# Patient Record
Sex: FEMALE | Race: BLACK OR AFRICAN AMERICAN | NOT HISPANIC OR LATINO | Employment: FULL TIME | URBAN - METROPOLITAN AREA
[De-identification: names, ages, dates, MRNs, and addresses within clinical notes are randomized per-mention and may not be internally consistent; named-entity substitution may affect disease eponyms.]

---

## 2018-02-06 ENCOUNTER — OFFICE VISIT (OUTPATIENT)
Dept: SLEEP MEDICINE | Facility: CLINIC | Age: 53
End: 2018-02-06
Payer: COMMERCIAL

## 2018-02-06 VITALS
HEART RATE: 76 BPM | WEIGHT: 182.31 LBS | DIASTOLIC BLOOD PRESSURE: 80 MMHG | HEIGHT: 64 IN | SYSTOLIC BLOOD PRESSURE: 130 MMHG | BODY MASS INDEX: 31.12 KG/M2

## 2018-02-06 DIAGNOSIS — G47.30 SLEEP APNEA, UNSPECIFIED TYPE: Primary | ICD-10-CM

## 2018-02-06 DIAGNOSIS — I10 ESSENTIAL HYPERTENSION: ICD-10-CM

## 2018-02-06 PROCEDURE — 99204 OFFICE O/P NEW MOD 45 MIN: CPT | Mod: S$GLB,,, | Performed by: NURSE PRACTITIONER

## 2018-02-06 PROCEDURE — 99999 PR PBB SHADOW E&M-NEW PATIENT-LVL III: CPT | Mod: PBBFAC,,, | Performed by: NURSE PRACTITIONER

## 2018-02-06 PROCEDURE — 3008F BODY MASS INDEX DOCD: CPT | Mod: S$GLB,,, | Performed by: NURSE PRACTITIONER

## 2018-02-06 RX ORDER — AMLODIPINE BESYLATE 5 MG/1
5 TABLET ORAL DAILY
COMMUNITY

## 2018-02-06 RX ORDER — AMLODIPINE BESYLATE 5 MG/1
5 TABLET ORAL DAILY
COMMUNITY
End: 2018-02-06

## 2018-02-06 RX ORDER — ACETAMINOPHEN AND CODEINE PHOSPHATE 120; 12 MG/5ML; MG/5ML
1 SOLUTION ORAL DAILY
COMMUNITY

## 2018-02-06 NOTE — PATIENT INSTRUCTIONS
Obstructive Sleep Apnea  Obstructive sleep apnea is a condition that causes your air passages to become narrowed or blocked during sleep. As a result, breathing stops for short periods. Your body wakes up enough for breathing to begin again, though you don't remember it. The cycle of stopped breathing and brief awakenings can repeat dozens of times a night. This prevents the body from getting to the deeper stages of sleep that are needed for good rest and may cause your body's oxygen level to fall.  Signs of sleep apnea include loud snoring, noisy breathing, and gasping sounds during sleep. Daytime symptoms include waking up tired after a full night's sleep, waking up with headaches, feeling very sleepy or falling asleep during the day, and having problems with memory or concentration.  Risk factors for sleep apnea include:  · Being overweight  · Being a man, or a woman in menopause  · Smoking  · Using alcohol or sedating medicines  · Having enlarged structures in the nose or throat  Home care  Lifestyle changes that can help treat snoring and sleep apnea include the following:  · If you are overweight, lose weight. Talk to your healthcare provider about a weight-loss plan for you.  · Avoid alcohol for 3 to 4 hours before bedtime. Avoid sedating medications. Ask your healthcare provider about the medicines you take.  · If you smoke, talk to your healthcare provider about ways to quit.  · Sleep on your side. This can help prevent gravity from pulling relaxed throat tissues into your breathing passages.  · If you have allergies or sinus problems that block your nose, ask your healthcare provider for help.  Follow-up care  Follow up with your healthcare provider, or as advised. A diagnosis of sleep apnea is made with a sleep study. Your healthcare provider can tell you more about this test.  When to seek medical advice  Sleep apnea can make you more likely to have certain health problems. These include high blood  pressure, heart attack, stroke, and sexual dysfunction. If you have sleep apnea, talk to your healthcare provider about the best treatments for you.  Date Last Reviewed: 4/1/2017  © 4875-6998 [employer]. [address] [place] [address], [place] [address]. All rights reserved. This information is not intended as a substitute for professional medical care. Always follow your healthcare professional's instructions.      Cecilia or Ciaran will contact you to schedule your sleep study. Their number is [phone] (ext 2). The [facility] is located on 7th floor of the [facility].    We will call you when the sleep study results are ready - if you have not heard from us by 2 weeks from the date of the study, please call [phone] (ext 1).    You are advised to abstain from driving should you feel sleepy or drowsy.

## 2018-02-06 NOTE — PROGRESS NOTES
"Eliza Richardson  was seen as a new patient, self-referred, for the evaluation of obstructive sleep apnea.     CHIEF COMPLAINT: Snoring    HISTORY OF PRESENT ILLNESS: Eliza Richardson a 53 y.o. female presents for the evaluation of obstructive sleep apnea. She has never had a sleep study. Denies sleep onset difficulties. Awakens several times during sleep. Easily returns to sleep but doesn't feel like she's getting deep sleep after awakened. +snoring. Infrequently + air gasping, wakes up trying to breathe. +early evening sleepiness, when home from work. Nocturia 1x. Occasional mouth drying in am. Denies am headaches or sinus congestion.     Denies symptoms of restless legs or kicking during sleep.     On todays [place] Sleepiness Scale the patient scores a 8/24.      8hr available sleep time    FAMILY HISTORY: No known sleep disorders    SOCIAL HISTORY: /sedentary, just started exercising(gym) 2x/week. Single. occasional ETOH, no tobacco    REVIEW OF SYSTEMS:  Sleep related symptoms as per HPI; + overweight;denies siinus congestion; Denies dyspnea; Denies palpitations; Denies acid reflux; Denies polyuria; Denies headaches;Denies mood disturbance.  Otherwise, a balance of 10 systems reviewed is negative        PHYSICAL EXAM:   /80   Pulse 76   Ht 5' 4" (1.626 m)   Wt 82.7 kg (182 lb 5.1 oz)   BMI 31.30 kg/m²   GENERAL: Obese body habitus, well groomed   HEENT: Conjunctivae are non-erythematous; Pupils equal, round, and reactive to light; Nose is symmetrical; Nasal mucosa is normal; Septum is midline; Inferior turbinates are normal; Nasal airflow is mildly restricted right; Posterior pharynx is pink; Modified Mallampati: IV; Posterior palate is low; Tonsils 1+; Uvula is normal;Tongue is high, broad,coated; Dentition is fair; No TMJ tenderness; Jaw opening and protrusion without click and without discomfort.   NECK: Supple. Neck circumference is 17.5 inches. No thyromegaly. No palpable " nodes.   SKIN: On face and neck: No abrasions, no rashes, no lesions. No subcutaneous nodules are palpable.   RESPIRATORY: Chest is clear to auscultation. Normal chest expansion and non-labored breathing at rest.   CARDIOVASCULAR: Normal S1, S2. No murmurs, gallops or rubs. No carotid bruits bilaterally.   EXTREMITIES: No edema. No clubbing. No cyanosis. Station normal. Gait normal.   NEURO/PSYCH: Oriented to time, place and person. Normal attention span and concentration. Affect is full. Mood is normal.       ASSESSMENT:     Unspecified Sleep Apnea, with symptoms of snoring, infrequent reported apneic pauses, disrupted sleep and early evening daytime sleepiness, with exam findings of a crowded oral airway, with medical comorbidities of obesity, hypertension. Warrants further investigation for untreated sleep apnea.     PLAN:   1. Polysomnogram, discussed plan of care  2. Discussed etiology of KEESHA and potential ramifications of untreated KEESHA, including stroke, heart disease, HTN.  We discussed potential treatment options, which could include weight loss (10-15%), body positioning, continuous positive airway pressure (CPAP-definitive), mandibular advancement splint by dentist, or referral for surgical consideration.   3. The patient was advised to abstain from driving should she feel sleepy or drowsy.   4. I will see the patient back after the study has been completed to review test results and plan of care.   5. See PCP re: HTN mgt and encouraged continued weight loss efforts for potential improvement of KEESHA and overall health benefits      Thank you for allowing me the opportunity to participate in the care of your patient

## 2018-03-15 DIAGNOSIS — G47.30 SLEEP APNEA, UNSPECIFIED TYPE: Primary | ICD-10-CM

## 2018-03-16 ENCOUNTER — TELEPHONE (OUTPATIENT)
Dept: SLEEP MEDICINE | Facility: OTHER | Age: 53
End: 2018-03-16

## 2021-06-02 ENCOUNTER — HOSPITAL ENCOUNTER (EMERGENCY)
Facility: HOSPITAL | Age: 56
Discharge: HOME | End: 2021-06-02
Attending: EMERGENCY MEDICINE
Payer: COMMERCIAL

## 2021-06-02 VITALS
HEART RATE: 90 BPM | TEMPERATURE: 98 F | BODY MASS INDEX: 32.44 KG/M2 | WEIGHT: 190 LBS | DIASTOLIC BLOOD PRESSURE: 80 MMHG | SYSTOLIC BLOOD PRESSURE: 126 MMHG | HEIGHT: 64 IN | RESPIRATION RATE: 17 BRPM | OXYGEN SATURATION: 100 %

## 2021-06-02 DIAGNOSIS — M54.50 ACUTE MIDLINE LOW BACK PAIN WITHOUT SCIATICA: ICD-10-CM

## 2021-06-02 DIAGNOSIS — S39.012A LUMBAR STRAIN, INITIAL ENCOUNTER: ICD-10-CM
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
## 2021-06-02 DIAGNOSIS — M54.2 NECK PAIN: ICD-10-CM

## 2021-06-02 DIAGNOSIS — S16.1XXA STRAIN OF NECK MUSCLE, INITIAL ENCOUNTER: ICD-10-CM

## 2021-06-02 PROCEDURE — 99284 EMERGENCY DEPT VISIT MOD MDM: CPT | Mod: 25

## 2021-06-02 PROCEDURE — 25000003 PHARM REV CODE 250: Performed by: NURSE PRACTITIONER

## 2021-06-02 RX ORDER — NAPROXEN 500 MG/1
500 TABLET ORAL 2 TIMES DAILY PRN
Qty: 10 TABLET | Refills: 0 | Status: SHIPPED | OUTPATIENT
Start: 2021-06-02 | End: 2021-06-07

## 2021-06-02 RX ORDER — METHOCARBAMOL 500 MG/1
1000 TABLET, FILM COATED ORAL 3 TIMES DAILY PRN
Qty: 18 TABLET | Refills: 0 | Status: SHIPPED | OUTPATIENT
Start: 2021-06-02

## 2021-06-02 RX ORDER — NAPROXEN 500 MG/1
500 TABLET ORAL
Status: COMPLETED | OUTPATIENT
Start: 2021-06-02 | End: 2021-06-02

## 2021-06-02 RX ORDER — LOSARTAN POTASSIUM 50 MG/1
50 TABLET ORAL DAILY
COMMUNITY
Start: 2021-04-30

## 2021-06-02 RX ORDER — METHOCARBAMOL 500 MG/1
1000 TABLET, FILM COATED ORAL
Status: COMPLETED | OUTPATIENT
Start: 2021-06-02 | End: 2021-06-02

## 2021-06-02 RX ORDER — METFORMIN HYDROCHLORIDE 500 MG/1
500 TABLET ORAL
COMMUNITY

## 2021-06-02 RX ADMIN — METHOCARBAMOL 1000 MG: 500 TABLET ORAL at 07:06

## 2021-06-02 RX ADMIN — NAPROXEN 500 MG: 500 TABLET ORAL at 07:06

## 2025-06-23 ENCOUNTER — OFFICE VISIT (OUTPATIENT)
Dept: URGENT CARE | Facility: CLINIC | Age: 60
End: 2025-06-23
Payer: COMMERCIAL

## 2025-06-23 VITALS
SYSTOLIC BLOOD PRESSURE: 119 MMHG | OXYGEN SATURATION: 97 % | RESPIRATION RATE: 18 BRPM | TEMPERATURE: 98 F | WEIGHT: 180 LBS | DIASTOLIC BLOOD PRESSURE: 80 MMHG | BODY MASS INDEX: 30.73 KG/M2 | HEIGHT: 64 IN | HEART RATE: 97 BPM

## 2025-06-23 DIAGNOSIS — R07.9 CHEST PAIN, UNSPECIFIED TYPE: Primary | ICD-10-CM

## 2025-06-23 LAB
OHS QRS DURATION: 82 MS
OHS QTC CALCULATION: 439 MS

## 2025-06-23 PROCEDURE — 99203 OFFICE O/P NEW LOW 30 MIN: CPT | Mod: S$GLB,,,

## 2025-06-23 PROCEDURE — 93010 ELECTROCARDIOGRAM REPORT: CPT | Mod: S$GLB,,, | Performed by: INTERNAL MEDICINE

## 2025-06-23 PROCEDURE — 93005 ELECTROCARDIOGRAM TRACING: CPT | Mod: S$GLB,,,

## 2025-06-23 RX ORDER — CLOBETASOL PROPIONATE 0.5 MG/G
CREAM TOPICAL 2 TIMES DAILY PRN
COMMUNITY
Start: 2025-06-11

## 2025-06-23 NOTE — PROGRESS NOTES
"Subjective:      Patient ID: Eliza Richardson is a 60 y.o. female.    Vitals:  height is 5' 4" (1.626 m) and weight is 81.6 kg (180 lb). Her oral temperature is 98.3 °F (36.8 °C). Her blood pressure is 119/80 and her pulse is 97. Her respiration is 18 and oxygen saturation is 97%.     Chief Complaint: Chest Pain    Pt is a 61 yo female presenting with sharp chest pain and back pain. Onset of symptoms was since Friday.Pt states that she isn't having any pain at the moment. Pt reports not using OTC medication to help with symptoms  .    Provider note    61 yo F c/o intermittent CP since Friday that started after eating dinner. Reports she ate grilled shrimp, sweet potato, and cheesecake prior to symptom onset. Pain continued off and on throughout the weekend. Denies nvd, palpitations, sob, heartburn, indigestion. Pain radiates to her back and is worse with movement. She is not experiencing any pain in clinic. Her PCP suggested she present to ED. Hx of htn, takes amlodipine and losartain daily. No recent change in dosing. No hx of MI. Denies recent heavy lifting or strenuous exercise. Has not taken anything otc.       Chest Pain   This is a new problem. The onset quality is sudden. The problem occurs constantly. The problem has been unchanged. The pain is present in the substernal region. The pain is at a severity of 8/10. The quality of the pain is described as sharp. The pain radiates to the upper back. Associated symptoms include back pain. Pertinent negatives include no abdominal pain, claudication, cough, diaphoresis, dizziness, exertional chest pressure, fever, leg pain, lower extremity edema, malaise/fatigue, nausea, palpitations, PND, shortness of breath, sputum production or syncope. The pain is aggravated by nothing. She has tried nothing for the symptoms.     Constitution: Negative for chills, sweating, fatigue and fever.   Cardiovascular:  Positive for chest pain. Negative for chest trauma, leg " swelling, palpitations, sob on exertion and passing out.   Respiratory:  Negative for chest tightness, cough, sputum production, shortness of breath, wheezing and asthma.    Gastrointestinal:  Negative for abdominal pain and nausea.   Musculoskeletal:  Positive for back pain.   Allergic/Immunologic: Negative for asthma.   Neurological:  Negative for dizziness.      Objective:     Physical Exam   Constitutional: She is oriented to person, place, and time. normal  HENT:   Head: Normocephalic and atraumatic.   Eyes: Conjunctivae are normal.   Cardiovascular: Normal rate, regular rhythm and normal heart sounds.   Pulmonary/Chest: Effort normal and breath sounds normal.   Abdominal: Normal appearance.   Musculoskeletal: Normal range of motion.         General: Normal range of motion.   Neurological: She is alert and oriented to person, place, and time.   Skin: Skin is warm and dry.     EKG: normal EKG, normal sinus rhythm.    Assessment:     1. Chest pain, unspecified type        Plan:   Discussed differentials such as cardiac etiology, heartburn, indigestion etc. Advised otc pepcid to see if symptoms resolve. Dietary changes such as no food 2 hours before bed. Strict ER precautions discussed and f/u with pcp.     Chest pain, unspecified type  -     EKG 12-lead        We appreciate you trusting us with your medical care. We hope you feel better soon. We will be happy to take care of you for all of your future medical needs.  You must understand that you've received an Urgent Care treatment only and that you may be released before all your medical problems are known or treated. You, the patient, will arrange for follow up care as instructed.  Follow up with your PCP or specialty clinic as directed in the next 1-2 weeks if not improved or as needed.  You can call [phone] to schedule an appointment with the appropriate provider.  Another option is to follow up with [facility]  ([email or web address]) virtually for quick simple medical advice.  If your condition worsens we recommend that you receive another evaluation at the emergency room immediately or contact your primary medical clinics after hours call service to discuss your concerns.  Please return here or go to the Emergency Department for any concerns or worsening of condition.